# Patient Record
(demographics unavailable — no encounter records)

---

## 2025-03-03 NOTE — ASSESSMENT
[FreeTextEntry1] :    #Iron deficiency anemia of pregnancy Discussed physiology of iron and erythropoiesis. Discussed iron tablet vs liquid vs intravenous 35 weeks pregnant Started oral iron x 1 month ago and has been compliant If Ferritin <30 will do IV iron with venofer x 5 doses Will call patient with lab results   Indonesian speaking: conversation held in Indonesian; all questions answered    OV pending lab results.  Labs next appointment: CBC, iron, B12, folate, ferritin, retic

## 2025-03-03 NOTE — HISTORY OF PRESENT ILLNESS
[de-identified] : Patient is a pleasant 32 year F  no repoprted PMHX      Presents to the office today with Anemia during pregnancy   Referred by PCP: Allen Valenzuela,   Denies any pertinent symptoms.  Currently  35 weeks pregnant planning a vagina delivery    Pertinent labs reviewed - 2025 Ferritin noted to be 9 started on Oral iron with vitamin C   Takes oral iron unknown dose 3 x a week with vitamin C   Denies anemia prior to pregnancy, any prior hx thyroid disorders, nor celiacs disease.   Denies any Fever, chills, hot or cold intolerance, unint wt loss/ wt gain, chest pain, SOB, Dizziness, lightheaded, fatigue, bleeding, epistaxis, hematuria, bruising, cold intolerance, heavy menses, altered bowel habits, N/V/C/D, BRBPR, black stool, change in nutrition.     Denies any herbal remedies, teas, OTC NSAIDS, trauma and surgical procedure     Menarche onset 13 yrs old: Heavy menses: normal menses.  Live births: 2 boys healthy ( 14 yrs old and 12 yrs old) Mammo: N/A Pap: normal by report      PSHx:Denies    Family Hx: Mother with know hx of anemia ( 46 yrs old).    Social HX:  Denes any ETOH consumption, smoking nor illicit drug use, Diet: eats red meat, fish, poultry and green leafy vegetables  Ethnicity: [place]ian    Medications reviewed

## 2025-03-03 NOTE — ASSESSMENT
[FreeTextEntry1] :    #Iron deficiency anemia of pregnancy Discussed physiology of iron and erythropoiesis. Discussed iron tablet vs liquid vs intravenous 35 weeks pregnant Started oral iron x 1 month ago and has been compliant If Ferritin <30 will do IV iron with venofer x 5 doses Will call patient with lab results   Chilean speaking: conversation held in Chilean; all questions answered    OV pending lab results.  Labs next appointment: CBC, iron, B12, folate, ferritin, retic

## 2025-03-03 NOTE — HISTORY OF PRESENT ILLNESS
[de-identified] : Patient is a pleasant 32 year F  no repoprted PMHX      Presents to the office today with Anemia during pregnancy   Referred by PCP: Allen Valenzuela,   Denies any pertinent symptoms.  Currently  35 weeks pregnant planning a vagina delivery    Pertinent labs reviewed - 2025 Ferritin noted to be 9 started on Oral iron with vitamin C   Takes oral iron unknown dose 3 x a week with vitamin C   Denies anemia prior to pregnancy, any prior hx thyroid disorders, nor celiacs disease.   Denies any Fever, chills, hot or cold intolerance, unint wt loss/ wt gain, chest pain, SOB, Dizziness, lightheaded, fatigue, bleeding, epistaxis, hematuria, bruising, cold intolerance, heavy menses, altered bowel habits, N/V/C/D, BRBPR, black stool, change in nutrition.     Denies any herbal remedies, teas, OTC NSAIDS, trauma and surgical procedure     Menarche onset 13 yrs old: Heavy menses: normal menses.  Live births: 2 boys healthy ( 14 yrs old and 12 yrs old) Mammo: N/A Pap: normal by report      PSHx:Denies    Family Hx: Mother with know hx of anemia ( 46 yrs old).    Social HX:  Denes any ETOH consumption, smoking nor illicit drug use, Diet: eats red meat, fish, poultry and green leafy vegetables  Ethnicity: [place]ian    Medications reviewed

## 2025-05-05 NOTE — ASSESSMENT
[FreeTextEntry1] :    #Iron deficiency anemia of pregnancy Discussed physiology of iron and erythropoiesis. Discussed iron tablet vs liquid vs intravenous 35 weeks pregnant Started oral iron x 1 month ago and has been compliant If Ferritin <30 will do IV iron with venofer x 5 doses Will call patient with lab results  05/05/2025 - Now 5-6 weeks post partum: labs today to trend anemia leevels  - if ferritin below 50 consider venofer iron infusions once more - if ferritin above 50 consider po iron  - if ferritin above 100 no further iron supplements - Further txt pending lab findings  - aware to return for close follow up if pt becomes pregnant again   Tristanian speaking: conversation held in Tristanian; all questions answered    OV PRN pending lab results  will call with results  Labs next appointment: CBC, iron, B12, folate, ferritin, retic

## 2025-05-05 NOTE — HISTORY OF PRESENT ILLNESS
[de-identified] : Patient is a pleasant 32 year F  no repoprted PMHX      Presents to the office today with Anemia during pregnancy   Referred by PCP: Allen Valenzuela,   Denies any pertinent symptoms.  Currently  35 weeks pregnant planning a vagina delivery    Pertinent labs reviewed - 2025 Ferritin noted to be 9 started on Oral iron with vitamin C   Takes oral iron unknown dose 3 x a week with vitamin C   Denies anemia prior to pregnancy, any prior hx thyroid disorders, nor celiacs disease.   Denies any Fever, chills, hot or cold intolerance, unint wt loss/ wt gain, chest pain, SOB, Dizziness, lightheaded, fatigue, bleeding, epistaxis, hematuria, bruising, cold intolerance, heavy menses, altered bowel habits, N/V/C/D, BRBPR, black stool, change in nutrition.     Denies any herbal remedies, teas, OTC NSAIDS, trauma and surgical procedure     Menarche onset 13 yrs old: Heavy menses: normal menses.  Live births: 2 boys healthy ( 14 yrs old and 12 yrs old) Mammo: N/A Pap: normal by report      PSHx:Denies    Family Hx: Mother with know hx of anemia ( 46 yrs old).    Social HX:  Denes any ETOH consumption, smoking nor illicit drug use, Diet: eats red meat, fish, poultry and green leafy vegetables  Ethnicity: [place]ian    Medications reviewed                            [de-identified] : 05/05/2025 - Here for follow up. Now 5 weeks post partum - Received a total of 5 iron infusions-- venofer 200 mg IV. Last infusion was March 27th, 2025  - Continues taking her prenatals. Feeling well today

## 2025-05-05 NOTE — ASSESSMENT
[FreeTextEntry1] :    #Iron deficiency anemia of pregnancy Discussed physiology of iron and erythropoiesis. Discussed iron tablet vs liquid vs intravenous 35 weeks pregnant Started oral iron x 1 month ago and has been compliant If Ferritin <30 will do IV iron with venofer x 5 doses Will call patient with lab results  05/05/2025 - Now 5-6 weeks post partum: labs today to trend anemia leevels  - if ferritin below 50 consider venofer iron infusions once more - if ferritin above 50 consider po iron  - if ferritin above 100 no further iron supplements - Further txt pending lab findings  - aware to return for close follow up if pt becomes pregnant again   Tajik speaking: conversation held in Tajik; all questions answered    OV PRN pending lab results  will call with results  Labs next appointment: CBC, iron, B12, folate, ferritin, retic

## 2025-07-15 NOTE — HISTORY OF PRESENT ILLNESS
[de-identified] : Patient is a pleasant 32 year F  no repoprted PMHX      Presents to the office today with Anemia during pregnancy   Referred by PCP: Allen Valenzuela,   Denies any pertinent symptoms.  Currently  35 weeks pregnant planning a vagina delivery    Pertinent labs reviewed - 2025 Ferritin noted to be 9 started on Oral iron with vitamin C   Takes oral iron unknown dose 3 x a week with vitamin C   Denies anemia prior to pregnancy, any prior hx thyroid disorders, nor celiacs disease.   Denies any Fever, chills, hot or cold intolerance, unint wt loss/ wt gain, chest pain, SOB, Dizziness, lightheaded, fatigue, bleeding, epistaxis, hematuria, bruising, cold intolerance, heavy menses, altered bowel habits, N/V/C/D, BRBPR, black stool, change in nutrition.     Denies any herbal remedies, teas, OTC NSAIDS, trauma and surgical procedure     Menarche onset 13 yrs old: Heavy menses: normal menses.  Live births: 2 boys healthy ( 14 yrs old and 12 yrs old) Mammo: N/A Pap: normal by report      PSHx:Denies    Family Hx: Mother with know hx of anemia ( 46 yrs old).    Social HX:  Denes any ETOH consumption, smoking nor illicit drug use, Diet: eats red meat, fish, poultry and green leafy vegetables  Ethnicity: [place]ian    Medications reviewed                            [de-identified] : 05/05/2025 - Here for follow up. Now 5 weeks post partum - Received a total of 5 iron infusions-- venofer 200 mg IV. Last infusion was March 27th, 2025  - Continues taking her prenatals. Feeling well today

## 2025-07-15 NOTE — ASSESSMENT
[FreeTextEntry1] :    #Iron deficiency anemia of pregnancy Discussed physiology of iron and erythropoiesis. Discussed iron tablet vs liquid vs intravenous 35 weeks pregnant Started oral iron x 1 month ago and has been compliant If Ferritin <30 will do IV iron with venofer x 5 doses Will call patient with lab results  05/05/2025 - Now 5-6 weeks post partum: labs today to trend anemia leevels  - if ferritin below 50 consider venofer iron infusions once more - if ferritin above 50 consider po iron  - if ferritin above 100 no further iron supplements - Further txt pending lab findings  - aware to return for close follow up if pt becomes pregnant again   Cameroonian speaking: conversation held in Cameroonian; all questions answered    OV PRN pending lab results  will call with results  Labs next appointment: CBC, iron, B12, folate, ferritin, retic